# Patient Record
Sex: MALE | Race: WHITE | NOT HISPANIC OR LATINO | URBAN - METROPOLITAN AREA
[De-identification: names, ages, dates, MRNs, and addresses within clinical notes are randomized per-mention and may not be internally consistent; named-entity substitution may affect disease eponyms.]

---

## 2020-10-22 ENCOUNTER — OFFICE VISIT (OUTPATIENT)
Dept: URBAN - METROPOLITAN AREA CLINIC 121 | Facility: CLINIC | Age: 80
End: 2020-10-22

## 2021-07-12 ENCOUNTER — OFFICE VISIT (OUTPATIENT)
Dept: URBAN - METROPOLITAN AREA CLINIC 63 | Facility: CLINIC | Age: 81
End: 2021-07-12

## 2021-08-09 ENCOUNTER — OFFICE VISIT (OUTPATIENT)
Dept: URBAN - METROPOLITAN AREA CLINIC 63 | Facility: CLINIC | Age: 81
End: 2021-08-09

## 2021-09-02 ENCOUNTER — OFFICE VISIT (OUTPATIENT)
Dept: URBAN - METROPOLITAN AREA CLINIC 63 | Facility: CLINIC | Age: 81
End: 2021-09-02

## 2021-11-24 ENCOUNTER — OFFICE VISIT (OUTPATIENT)
Dept: URBAN - METROPOLITAN AREA CLINIC 121 | Facility: CLINIC | Age: 81
End: 2021-11-24

## 2022-01-11 ENCOUNTER — OFFICE VISIT (OUTPATIENT)
Dept: URBAN - METROPOLITAN AREA CLINIC 63 | Facility: CLINIC | Age: 82
End: 2022-01-11

## 2022-07-09 ENCOUNTER — TELEPHONE ENCOUNTER (OUTPATIENT)
Dept: URBAN - METROPOLITAN AREA CLINIC 121 | Facility: CLINIC | Age: 82
End: 2022-07-09

## 2022-07-09 RX ORDER — ATORVASTATIN CALCIUM 40 MG/1
TABLET, FILM COATED ORAL
Refills: 0 | OUTPATIENT
Start: 2021-05-21 | End: 2021-07-12

## 2022-07-09 RX ORDER — TESTOSTERONE CYPIONATE 200 MG/ML
4WK INTERVALS INJECTION, SOLUTION INTRAMUSCULAR
Refills: 0 | OUTPATIENT
Start: 2021-07-12 | End: 2021-08-09

## 2022-07-09 RX ORDER — CARBIDOPA AND LEVODOPA 25; 100 MG/1; MG/1
TABLET ORAL THREE TIMES A DAY
Refills: 0 | OUTPATIENT
Start: 2021-08-09 | End: 2021-08-09

## 2022-07-09 RX ORDER — ATENOLOL 50 MG/1
TABLET ORAL TWICE A DAY
Refills: 0 | OUTPATIENT
Start: 2021-08-09 | End: 2021-09-02

## 2022-07-09 RX ORDER — KETOCONAZOLE 20 MG/G
3X WEEKLY CREAM TOPICAL
Refills: 0 | OUTPATIENT
Start: 2021-08-09 | End: 2021-09-02

## 2022-07-09 RX ORDER — ESOMEPRAZOLE MAGNESIUM 40 MG/1
GRANULE, DELAYED RELEASE ORAL ONCE A DAY
Refills: 0 | OUTPATIENT
Start: 2021-08-09 | End: 2021-09-02

## 2022-07-09 RX ORDER — CARBIDOPA AND LEVODOPA 25; 100 MG/1; MG/1
TABLET ORAL
Refills: 0 | OUTPATIENT
Start: 2021-09-02 | End: 2021-09-02

## 2022-07-09 RX ORDER — CARBIDOPA AND LEVODOPA 25; 100 MG/1; MG/1
TABLET ORAL
Refills: 0 | OUTPATIENT
Start: 2021-05-14 | End: 2021-07-12

## 2022-07-09 RX ORDER — ESOMEPRAZOLE MAGNESIUM 40 MG/1
GRANULE, DELAYED RELEASE ORAL ONCE A DAY
Refills: 0 | OUTPATIENT
Start: 2021-07-12 | End: 2021-08-09

## 2022-07-09 RX ORDER — ATENOLOL 50 MG/1
TABLET ORAL TWICE A DAY
Refills: 0 | OUTPATIENT
Start: 2021-09-02 | End: 2021-09-02

## 2022-07-09 RX ORDER — TESTOSTERONE CYPIONATE 200 MG/ML
4WK INTERVALS INJECTION, SOLUTION INTRAMUSCULAR
Refills: 0 | OUTPATIENT
Start: 2021-08-09 | End: 2021-09-02

## 2022-07-09 RX ORDER — CLONAZEPAM 0.5 MG/1
TABLET ORAL ONCE A DAY
Refills: 0 | OUTPATIENT
Start: 2021-07-12 | End: 2021-08-09

## 2022-07-09 RX ORDER — NITROGLYCERIN 0.4 MG/1
TABLET SUBLINGUAL AS NEEDED
Refills: 0 | OUTPATIENT
Start: 2021-09-02 | End: 2022-01-11

## 2022-07-09 RX ORDER — CLONAZEPAM 0.5 MG/1
TABLET ORAL ONCE A DAY
Refills: 0 | OUTPATIENT
Start: 2021-09-02 | End: 2022-01-11

## 2022-07-09 RX ORDER — ASPIRIN 81 MG/1
TABLET, COATED ORAL ONCE A DAY
Refills: 0 | OUTPATIENT
Start: 2021-07-12 | End: 2021-08-09

## 2022-07-09 RX ORDER — TESTOSTERONE CYPIONATE 200 MG/ML
4WK INTERVALS INJECTION, SOLUTION INTRAMUSCULAR
Refills: 0 | OUTPATIENT
Start: 2021-09-02 | End: 2022-01-11

## 2022-07-09 RX ORDER — CARBIDOPA AND LEVODOPA 25; 100 MG/1; MG/1
TABLET ORAL THREE TIMES A DAY
Refills: 0 | OUTPATIENT
Start: 2021-07-12 | End: 2021-08-09

## 2022-07-09 RX ORDER — NITROGLYCERIN 0.4 MG/1
TABLET SUBLINGUAL AS NEEDED
Refills: 0 | OUTPATIENT
Start: 2021-08-09 | End: 2021-09-02

## 2022-07-09 RX ORDER — ESOMEPRAZOLE MAGNESIUM 40 MG/1
ONCE A DAY CAPSULE, DELAYED RELEASE ORAL ONCE A DAY
Refills: 0 | OUTPATIENT
Start: 2021-09-07 | End: 2021-10-12

## 2022-07-09 RX ORDER — LEVOTHYROXINE SODIUM 50 UG/1
TABLET ORAL ONCE A DAY
Refills: 0 | OUTPATIENT
Start: 2021-08-09 | End: 2021-09-02

## 2022-07-09 RX ORDER — AMLODIPINE BESYLATE 10 MG/1
TABLET ORAL ONCE A DAY
Refills: 0 | OUTPATIENT
Start: 2021-07-12 | End: 2021-08-09

## 2022-07-09 RX ORDER — CARBIDOPA AND LEVODOPA 25; 100 MG/1; MG/1
TABLET ORAL
Refills: 0 | OUTPATIENT
Start: 2021-08-09 | End: 2021-09-02

## 2022-07-09 RX ORDER — SILDENAFIL 20 MG/1
TABLET, FILM COATED ORAL AS NEEDED
Refills: 0 | OUTPATIENT
Start: 2021-08-09 | End: 2021-09-02

## 2022-07-09 RX ORDER — ESOMEPRAZOLE MAGNESIUM 40 MG/1
ONCE A DAY CAPSULE, DELAYED RELEASE ORAL ONCE A DAY
Refills: 0 | OUTPATIENT
Start: 2021-10-12 | End: 2021-10-29

## 2022-07-09 RX ORDER — SILDENAFIL 20 MG/1
TABLET, FILM COATED ORAL AS NEEDED
Refills: 0 | OUTPATIENT
Start: 2021-07-12 | End: 2021-08-09

## 2022-07-09 RX ORDER — LEVOTHYROXINE SODIUM 50 UG/1
TABLET ORAL ONCE A DAY
Refills: 0 | OUTPATIENT
Start: 2021-07-12 | End: 2021-08-09

## 2022-07-09 RX ORDER — ATENOLOL 50 MG/1
TABLET ORAL ONCE A DAY
Refills: 0 | OUTPATIENT
Start: 2021-09-02 | End: 2022-01-11

## 2022-07-09 RX ORDER — ESOMEPRAZOLE MAGNESIUM 40 MG/1
GRANULE, DELAYED RELEASE ORAL ONCE A DAY
Refills: 0 | OUTPATIENT
Start: 2021-09-02 | End: 2022-01-11

## 2022-07-09 RX ORDER — AMLODIPINE BESYLATE 10 MG/1
TABLET ORAL
Refills: 0 | OUTPATIENT
Start: 2021-05-27 | End: 2021-07-12

## 2022-07-09 RX ORDER — ASPIRIN 81 MG/1
TABLET, COATED ORAL ONCE A DAY
Refills: 0 | OUTPATIENT
Start: 2021-09-02 | End: 2022-01-11

## 2022-07-09 RX ORDER — ATENOLOL 50 MG/1
TABLET ORAL TWICE A DAY
Refills: 0 | OUTPATIENT
Start: 2021-08-09 | End: 2021-08-09

## 2022-07-09 RX ORDER — NITROGLYCERIN 0.4 MG/1
TABLET SUBLINGUAL AS NEEDED
Refills: 0 | OUTPATIENT
Start: 2021-07-12 | End: 2021-08-09

## 2022-07-09 RX ORDER — ATORVASTATIN CALCIUM 40 MG/1
TABLET, FILM COATED ORAL ONCE A DAY
Refills: 0 | OUTPATIENT
Start: 2021-08-09 | End: 2021-09-02

## 2022-07-09 RX ORDER — PANTOPRAZOLE SODIUM 40 MG/1
TABLET, DELAYED RELEASE ORAL
Refills: 0 | OUTPATIENT
Start: 2021-07-07 | End: 2021-07-12

## 2022-07-09 RX ORDER — ATORVASTATIN CALCIUM 40 MG/1
TABLET, FILM COATED ORAL ONCE A DAY
Refills: 0 | OUTPATIENT
Start: 2021-07-12 | End: 2021-08-09

## 2022-07-09 RX ORDER — ESOMEPRAZOLE MAGNESIUM 40 MG/1
ONCE A DAY CAPSULE, DELAYED RELEASE ORAL ONCE A DAY
Refills: 0 | OUTPATIENT
Start: 2021-07-28 | End: 2021-09-07

## 2022-07-09 RX ORDER — ESOMEPRAZOLE MAGNESIUM 40 MG/1
ONCE A DAY CAPSULE, DELAYED RELEASE ORAL ONCE A DAY
Refills: 0 | OUTPATIENT
Start: 2021-10-29 | End: 2022-01-11

## 2022-07-09 RX ORDER — KETOCONAZOLE 20 MG/G
3X WEEKLY CREAM TOPICAL
Refills: 0 | OUTPATIENT
Start: 2021-07-12 | End: 2021-08-09

## 2022-07-09 RX ORDER — ATENOLOL 50 MG/1
TABLET ORAL TWICE A DAY
Refills: 0 | OUTPATIENT
Start: 2021-07-12 | End: 2021-08-09

## 2022-07-09 RX ORDER — AMLODIPINE BESYLATE 10 MG/1
TABLET ORAL ONCE A DAY
Refills: 0 | OUTPATIENT
Start: 2021-08-09 | End: 2021-09-02

## 2022-07-09 RX ORDER — LEVOTHYROXINE SODIUM 50 UG/1
TABLET ORAL
Refills: 0 | OUTPATIENT
Start: 2021-06-30 | End: 2021-07-12

## 2022-07-09 RX ORDER — ASPIRIN 81 MG/1
TABLET, COATED ORAL ONCE A DAY
Refills: 0 | OUTPATIENT
Start: 2021-08-09 | End: 2021-09-02

## 2022-07-09 RX ORDER — CLONAZEPAM 0.5 MG/1
TABLET ORAL ONCE A DAY
Refills: 0 | OUTPATIENT
Start: 2021-08-09 | End: 2021-09-02

## 2022-07-09 RX ORDER — KETOCONAZOLE 20 MG/G
3X WEEKLY CREAM TOPICAL
Refills: 0 | OUTPATIENT
Start: 2021-09-02 | End: 2022-01-11

## 2022-07-09 RX ORDER — SILDENAFIL 20 MG/1
TABLET, FILM COATED ORAL AS NEEDED
Refills: 0 | OUTPATIENT
Start: 2021-09-02 | End: 2022-01-11

## 2022-07-10 ENCOUNTER — TELEPHONE ENCOUNTER (OUTPATIENT)
Dept: URBAN - METROPOLITAN AREA CLINIC 121 | Facility: CLINIC | Age: 82
End: 2022-07-10

## 2022-07-10 RX ORDER — LEVOTHYROXINE SODIUM 50 UG/1
TABLET ORAL ONCE A DAY
Refills: 0 | Status: ACTIVE | COMMUNITY
Start: 2021-09-02

## 2022-07-10 RX ORDER — ESOMEPRAZOLE MAGNESIUM 40 MG/1
ONCE A DAY CAPSULE, DELAYED RELEASE ORAL ONCE A DAY
Refills: 3 | Status: ACTIVE | COMMUNITY
Start: 2022-01-11

## 2022-07-10 RX ORDER — AMLODIPINE BESYLATE 10 MG/1
TABLET ORAL ONCE A DAY
Refills: 0 | Status: ACTIVE | COMMUNITY
Start: 2021-09-02

## 2022-07-10 RX ORDER — KETOCONAZOLE 20 MG/G
3X WEEKLY CREAM TOPICAL
Refills: 0 | Status: ACTIVE | COMMUNITY
Start: 2022-01-11

## 2022-07-10 RX ORDER — CLONAZEPAM 0.5 MG/1
TABLET ORAL
Refills: 0 | Status: ACTIVE | COMMUNITY
Start: 2022-01-11

## 2022-07-10 RX ORDER — ASPIRIN 81 MG/1
TABLET, COATED ORAL ONCE A DAY
Refills: 0 | Status: ACTIVE | COMMUNITY
Start: 2022-01-11

## 2022-07-10 RX ORDER — ATORVASTATIN CALCIUM 40 MG/1
TABLET, FILM COATED ORAL ONCE A DAY
Refills: 0 | Status: ACTIVE | COMMUNITY
Start: 2022-01-11

## 2022-07-10 RX ORDER — SILDENAFIL 20 MG/1
TABLET, FILM COATED ORAL AS NEEDED
Refills: 0 | Status: ACTIVE | COMMUNITY
Start: 2022-01-11

## 2022-07-10 RX ORDER — AMLODIPINE BESYLATE 10 MG/1
TABLET ORAL ONCE A DAY
Refills: 0 | Status: ACTIVE | COMMUNITY
Start: 2022-01-11

## 2022-07-10 RX ORDER — LEVOTHYROXINE SODIUM 50 UG/1
TABLET ORAL ONCE A DAY
Refills: 0 | Status: ACTIVE | COMMUNITY
Start: 2022-01-11

## 2022-07-10 RX ORDER — TESTOSTERONE CYPIONATE 200 MG/ML
4WK INTERVALS INJECTION, SOLUTION INTRAMUSCULAR
Refills: 0 | Status: ACTIVE | COMMUNITY
Start: 2022-01-11

## 2022-07-10 RX ORDER — CARBIDOPA AND LEVODOPA 25; 100 MG/1; MG/1
TABLET ORAL
Refills: 0 | Status: ACTIVE | COMMUNITY
Start: 2022-01-11

## 2022-07-10 RX ORDER — NITROGLYCERIN 0.4 MG/1
TABLET SUBLINGUAL AS NEEDED
Refills: 0 | Status: ACTIVE | COMMUNITY
Start: 2022-01-11

## 2022-07-10 RX ORDER — ATENOLOL 50 MG/1
TABLET ORAL ONCE A DAY
Refills: 0 | Status: ACTIVE | COMMUNITY
Start: 2022-01-11

## 2022-07-10 RX ORDER — CARBIDOPA AND LEVODOPA 25; 100 MG/1; MG/1
7 TABS QD TABLET ORAL
Refills: 0 | Status: ACTIVE | COMMUNITY
Start: 2021-09-02

## 2022-07-10 RX ORDER — ATORVASTATIN CALCIUM 40 MG/1
TABLET, FILM COATED ORAL ONCE A DAY
Refills: 0 | Status: ACTIVE | COMMUNITY
Start: 2021-09-02

## 2023-01-04 ENCOUNTER — TELEPHONE ENCOUNTER (OUTPATIENT)
Dept: URBAN - METROPOLITAN AREA CLINIC 63 | Facility: CLINIC | Age: 83
End: 2023-01-04

## 2023-01-04 RX ORDER — ESOMEPRAZOLE MAGNESIUM 40 MG/1
AS DIRECTED CAPSULE, DELAYED RELEASE ORAL ONCE A DAY
Qty: 90 CAPSULE | Refills: 0

## 2023-03-15 ENCOUNTER — WEB ENCOUNTER (OUTPATIENT)
Dept: URBAN - METROPOLITAN AREA CLINIC 63 | Facility: CLINIC | Age: 83
End: 2023-03-15

## 2023-03-15 ENCOUNTER — OFFICE VISIT (OUTPATIENT)
Dept: URBAN - METROPOLITAN AREA CLINIC 63 | Facility: CLINIC | Age: 83
End: 2023-03-15
Payer: MEDICARE

## 2023-03-15 VITALS
SYSTOLIC BLOOD PRESSURE: 124 MMHG | HEART RATE: 53 BPM | OXYGEN SATURATION: 98 % | BODY MASS INDEX: 21.13 KG/M2 | WEIGHT: 156 LBS | DIASTOLIC BLOOD PRESSURE: 64 MMHG | TEMPERATURE: 96.9 F | HEIGHT: 72 IN

## 2023-03-15 DIAGNOSIS — K62.5 RECTAL BLEEDING: ICD-10-CM

## 2023-03-15 DIAGNOSIS — G20 PARKINSON DISEASE: ICD-10-CM

## 2023-03-15 DIAGNOSIS — K59.04 CHRONIC IDIOPATHIC CONSTIPATION: ICD-10-CM

## 2023-03-15 PROBLEM — 82934008 CHRONIC IDIOPATHIC CONSTIPATION: Status: ACTIVE | Noted: 2023-03-15

## 2023-03-15 PROBLEM — 49049000: Status: ACTIVE | Noted: 2023-03-15

## 2023-03-15 PROCEDURE — 99214 OFFICE O/P EST MOD 30 MIN: CPT | Performed by: NURSE PRACTITIONER

## 2023-03-15 RX ORDER — CLONAZEPAM 0.5 MG/1
TABLET ORAL
Refills: 0 | Status: ACTIVE | COMMUNITY
Start: 2022-01-11

## 2023-03-15 RX ORDER — NITROGLYCERIN 0.4 MG/1
TABLET SUBLINGUAL AS NEEDED
Refills: 0 | Status: ACTIVE | COMMUNITY
Start: 2022-01-11

## 2023-03-15 RX ORDER — SILDENAFIL 20 MG/1
TABLET, FILM COATED ORAL AS NEEDED
Refills: 0 | Status: ACTIVE | COMMUNITY
Start: 2022-01-11

## 2023-03-15 RX ORDER — ASPIRIN 81 MG/1
TABLET, COATED ORAL ONCE A DAY
Refills: 0 | Status: ACTIVE | COMMUNITY
Start: 2022-01-11

## 2023-03-15 RX ORDER — AMLODIPINE BESYLATE 10 MG/1
TABLET ORAL ONCE A DAY
Refills: 0 | Status: ACTIVE | COMMUNITY
Start: 2022-01-11

## 2023-03-15 RX ORDER — ESOMEPRAZOLE MAGNESIUM 40 MG/1
AS DIRECTED CAPSULE, DELAYED RELEASE ORAL ONCE A DAY
Qty: 90 CAPSULE | Refills: 0 | Status: ACTIVE | COMMUNITY

## 2023-03-15 RX ORDER — LEVOTHYROXINE SODIUM 50 UG/1
TABLET ORAL ONCE A DAY
Refills: 0 | Status: ACTIVE | COMMUNITY
Start: 2022-01-11

## 2023-03-15 RX ORDER — ATORVASTATIN CALCIUM 40 MG/1
TABLET, FILM COATED ORAL ONCE A DAY
Refills: 0 | Status: ACTIVE | COMMUNITY
Start: 2021-09-02

## 2023-03-15 RX ORDER — CARBIDOPA AND LEVODOPA 25; 100 MG/1; MG/1
7 TABS QD TABLET ORAL
Refills: 0 | Status: ACTIVE | COMMUNITY
Start: 2021-09-02

## 2023-03-15 RX ORDER — ATENOLOL 50 MG/1
TABLET ORAL ONCE A DAY
Refills: 0 | Status: ACTIVE | COMMUNITY
Start: 2022-01-11

## 2023-03-15 RX ORDER — BISACODYL 5 MG
1 TABLET AS NEEDED TABLET, DELAYED RELEASE (ENTERIC COATED) ORAL ONCE A DAY
Status: ACTIVE | COMMUNITY

## 2023-03-15 NOTE — HPI-PREVIOUS PROCEDURES
EGD/04 February 2020 (Saroj Baez MD).  There was salmon-colored mucosa suggestive of short segment Lau's esophagus noted in the GE junction.  Multiple cold forceps biopsies were performed for Lau's surveillance.  The biopsies were taken of the GE junction. PATHOLOGY: Gastroesophageal junction biopsy demonstrates reflux esophagitis changes and negative for intestinal metaplasia and dysplasia. PLAN: Recommend repeat EGD in 2 years.  Continue antireflux measures. ********** Colonoscopy/01 February 2019 (Saroj Ramírez MD).  There is stool found in the sigmoid colon.  6 mm tubular adenomatous polyp removed from the cecum.  5 mm tubular adenomatous polyp removed from the ascending colon.  Diverticulosis noted in the descending and sigmoid colon with internal hemorrhoids present.  All remaining findings are negative or benign.

## 2023-03-15 NOTE — HPI-HPI
Thank you very much for kindly referring Pepe Espinoza, a very pleasant 82-year-old male, back to our service for constipation.  Past medical history significant for atrial fibrillation, PVCs, CAD (PTCA with stent), HTN, HLD, PVD, sleep apnea (CPAP), COPD, Parkinson's disease, GERD, Lau's esophagus and colon polyps.  Past surgical history significant for tooth extraction.  His last EGD was 04 February 2020 and his last complete colonoscopy was 01 February 2019 (see results below).  Pepe presents today complaining of constipation averaging 1 bowel movement per week with use of a single Dulcolax tablet every other day.  He tried using it every day with no significant change but when he uses 2 tablets/day and results and "blowout" stools.  He relates an episode of painless bright red blood per rectum last week with blood streaking the side of the stool and on the paper after a difficult to pass bowel movement.  He has had no further bleeding since that time.  He is otherwise asymptomatic from a GI perspective.

## 2023-03-15 NOTE — HPI-PREVIOUS IMAGING
CT virtual colonography/19 August 2021.  1.  No large polyp, stricture or mass.  Mild to moderate diverticulosis of the sigmoid colon.  No further colorectal cancer screening is recommended due to age. ********** XR UGI with swallow including  delayed imaging/05 August 2021.  Normal single contrast barium UGI.  Mildly dilated loops of bowel in the mid abdomen and right lower abdomen, abdomen/pelvis CT may be considered for further evaluation. ********** Chest x-ray performed July 7, 2021 revealing chronic interstitial opacities, no definite acute abnormality. ********** CT abdomen and pelvis/06 July 2021. No acute abnormality in the abdomen or pelvis.  No evidence of suspicious masses or lymphadenopathy.  Several small simple appearing renal cysts bilaterally.  No evidence for stones or obstructive uropathy.  Large diverticulum of the duodenum without inflammatory changes.  Constipation.

## 2023-03-24 ENCOUNTER — ERX REFILL RESPONSE (OUTPATIENT)
Dept: URBAN - METROPOLITAN AREA CLINIC 63 | Facility: CLINIC | Age: 83
End: 2023-03-24

## 2023-03-24 RX ORDER — ESOMEPRAZOLE MAGNESIUM 40 MG/1
AS DIRECTED CAPSULE, DELAYED RELEASE ORAL ONCE A DAY
Qty: 90 CAPSULE | Refills: 0 | OUTPATIENT

## 2023-03-24 RX ORDER — ESOMEPRAZOLE MAGNESIUM 40 MG/1
TAKE 1 CAPSULE BY MOUTH ONCE DAILY AS DIRECTED (WILL  NEED  AN  OFFICE  VISIT  OR  TELEHEALTH  FOR  FURTHER  REFILLS) CAPSULE, DELAYED RELEASE ORAL
Qty: 90 CAPSULE | Refills: 0 | OUTPATIENT

## 2023-06-26 ENCOUNTER — ERX REFILL RESPONSE (OUTPATIENT)
Dept: URBAN - METROPOLITAN AREA CLINIC 63 | Facility: CLINIC | Age: 83
End: 2023-06-26

## 2023-06-26 RX ORDER — ESOMEPRAZOLE MAGNESIUM 40 MG/1
TAKE 1 CAPSULE BY MOUTH ONCE DAILY AS DIRECTED (WILL  NEED  AN  OFFICE  VISIT  OR  TELEHEALTH  FOR  FURTHER  REFILLS) CAPSULE, DELAYED RELEASE ORAL
Qty: 90 CAPSULE | Refills: 0 | OUTPATIENT

## 2023-09-18 ENCOUNTER — ERX REFILL RESPONSE (OUTPATIENT)
Dept: URBAN - METROPOLITAN AREA CLINIC 63 | Facility: CLINIC | Age: 83
End: 2023-09-18

## 2023-09-18 RX ORDER — ESOMEPRAZOLE MAGNESIUM 40 MG/1
TAKE 1 CAPSULE BY MOUTH ONCE DAILY AS DIRECTED (WILL  NEED  AN  OFFICE  VISIT  OR  TELEHEALTH  FOR  FURTHER  REFILLS) CAPSULE, DELAYED RELEASE ORAL
Qty: 90 CAPSULE | Refills: 0 | OUTPATIENT

## 2023-09-18 RX ORDER — ESOMEPRAZOLE MAGNESIUM 40 MG/1
TAKE 1 CAPSULE BY MOUTH ONCE DAILY AS DIRECTED CAPSULE, DELAYED RELEASE ORAL
Qty: 90 CAPSULE | Refills: 1 | OUTPATIENT

## 2023-10-05 ENCOUNTER — TELEPHONE ENCOUNTER (OUTPATIENT)
Dept: URBAN - METROPOLITAN AREA CLINIC 46 | Facility: CLINIC | Age: 83
End: 2023-10-05

## 2023-10-06 ENCOUNTER — OFFICE VISIT (OUTPATIENT)
Dept: URBAN - METROPOLITAN AREA CLINIC 63 | Facility: CLINIC | Age: 83
End: 2023-10-06
Payer: MEDICARE

## 2023-10-06 ENCOUNTER — TELEPHONE ENCOUNTER (OUTPATIENT)
Dept: URBAN - METROPOLITAN AREA CLINIC 63 | Facility: CLINIC | Age: 83
End: 2023-10-06

## 2023-10-06 VITALS
HEART RATE: 71 BPM | DIASTOLIC BLOOD PRESSURE: 80 MMHG | OXYGEN SATURATION: 96 % | HEIGHT: 72 IN | BODY MASS INDEX: 20.18 KG/M2 | SYSTOLIC BLOOD PRESSURE: 120 MMHG | WEIGHT: 149 LBS | TEMPERATURE: 98.6 F

## 2023-10-06 DIAGNOSIS — K56.41 FECAL IMPACTION OF COLON: ICD-10-CM

## 2023-10-06 PROCEDURE — 99214 OFFICE O/P EST MOD 30 MIN: CPT | Performed by: NURSE PRACTITIONER

## 2023-10-06 RX ORDER — ASPIRIN 81 MG/1
TABLET, COATED ORAL ONCE A DAY
Refills: 0 | Status: ACTIVE | COMMUNITY
Start: 2022-01-11

## 2023-10-06 RX ORDER — CLONAZEPAM 0.5 MG/1
TABLET ORAL
Refills: 0 | Status: ACTIVE | COMMUNITY
Start: 2022-01-11

## 2023-10-06 RX ORDER — CARBIDOPA AND LEVODOPA 25; 100 MG/1; MG/1
7 TABS QD TABLET ORAL
Refills: 0 | Status: ACTIVE | COMMUNITY
Start: 2021-09-02

## 2023-10-06 RX ORDER — ONDANSETRON HYDROCHLORIDE 4 MG/1
1 TABLET TABLET, FILM COATED ORAL
Qty: 7 | Refills: 1 | OUTPATIENT
Start: 2023-10-06

## 2023-10-06 RX ORDER — BISACODYL 5 MG
1 TABLET AS NEEDED TABLET, DELAYED RELEASE (ENTERIC COATED) ORAL ONCE A DAY
Status: ACTIVE | COMMUNITY

## 2023-10-06 RX ORDER — NITROGLYCERIN 0.4 MG/1
TABLET SUBLINGUAL AS NEEDED
Refills: 0 | Status: ACTIVE | COMMUNITY
Start: 2022-01-11

## 2023-10-06 RX ORDER — ESOMEPRAZOLE MAGNESIUM 40 MG/1
TAKE 1 CAPSULE BY MOUTH ONCE DAILY AS DIRECTED CAPSULE, DELAYED RELEASE ORAL
Qty: 90 CAPSULE | Refills: 1 | Status: ACTIVE | COMMUNITY

## 2023-10-06 RX ORDER — POLYETHYLENE GLYCOL 3350, SODIUM SULFATE, SODIUM CHLORIDE, POTASSIUM CHLORIDE, ASCORBIC ACID, SODIUM ASCORBATE 140-9-5.2G
1,000 ML KIT ORAL ONCE
Refills: 0
Start: 2023-10-06 | End: 2023-10-07

## 2023-10-06 RX ORDER — ATORVASTATIN CALCIUM 40 MG/1
TABLET, FILM COATED ORAL ONCE A DAY
Refills: 0 | Status: ACTIVE | COMMUNITY
Start: 2021-09-02

## 2023-10-06 RX ORDER — AMLODIPINE BESYLATE 10 MG/1
TABLET ORAL ONCE A DAY
Refills: 0 | Status: ACTIVE | COMMUNITY
Start: 2022-01-11

## 2023-10-06 RX ORDER — LEVOTHYROXINE SODIUM 50 UG/1
TABLET ORAL ONCE A DAY
Refills: 0 | Status: ACTIVE | COMMUNITY
Start: 2022-01-11

## 2023-10-06 RX ORDER — ATENOLOL 50 MG/1
TABLET ORAL ONCE A DAY
Refills: 0 | Status: ACTIVE | COMMUNITY
Start: 2022-01-11

## 2023-10-06 NOTE — HPI-PREVIOUS IMAGING
CT abdomen and pelvis without contrast/04 October 2023.  1. Moderate amount of stool throughout the colon. Retained stool in the rectum measures 7.9 cm in diameter. Correlate for fecal impaction. No proximal small bowel distention or inflammatory changes.  2. Low-attenuation renal lesions measure up to 6.1 x 4.4 cm in the superior pole left kidney, likely cysts. ********** CT virtual colonography/19 August 2021.  1.  No large polyp, stricture or mass.  Mild to moderate diverticulosis of the sigmoid colon.  No further colorectal cancer screening is recommended due to age. ********** XR UGI with swallow including  delayed imaging/05 August 2021.  Normal single contrast barium UGI.  Mildly dilated loops of bowel in the mid abdomen and right lower abdomen, abdomen/pelvis CT may be considered for further evaluation. ********** Chest x-ray performed July 7, 2021 revealing chronic interstitial opacities, no definite acute abnormality. ********** CT abdomen and pelvis/06 July 2021. No acute abnormality in the abdomen or pelvis.  No evidence of suspicious masses or lymphadenopathy.  Several small simple appearing renal cysts bilaterally.  No evidence for stones or obstructive uropathy.  Large diverticulum of the duodenum without inflammatory changes.  Constipation.

## 2023-10-06 NOTE — HPI-PREVIOUS LABS
Lab work dated 04 October 2023 demonstrates the following abnormalities: Glucose 137.  All remaining lab values of CBC, CMP and lipase are within normal limits.

## 2023-10-06 NOTE — HPI-TODAY'S VISIT:
Pepe Espinoza (Gene) is a very pleasant 83-year-old male seen ER follow-up for constipation.  Past medical history is significant for atrial fibrillation (ASA, atenolol), BERNICE, ASHD, HLD, peripheral vascular disease, gastroesophageal reflux disease/possible Lau's, palpitations, COPD, Parkinson's disease, hypertension, hypogonadism, former smoker and colon polyps.  Past surgical history significant for balloon angioplasty with stent to the right coronary artery and dental surgery.  2 nights ago, Alexandro was feeling abdominal discomfort and states he had not had a bowel movement for 1 week.  He went to the ER at [facility] and was disimpacted and a follow-on soapsuds enema was administered.  He was instructed to use MiraLAX and follow-up with us as an outpatient.  Since that time, he has still not had a bowel movement of any significant quantity and is concerned about this becoming a chronic issue.  He presents today feeling well just wanting to get a regimen that will help keep him regular.  He denies pyrosis, dysphagia, dyspepsia, abdominal pain, blood per rectum, melena or unintentional weight loss.  **********  As per [facility] ED provider note dated 04 October 2023 (Gorge Lofton MD): "The patient is a 83 y.o. male with past medical history of atrial fibrillation, coronary artery disease, hyperlipidemia, COPD, parkinson disease, essential hypertension, Lau's esophagus, constipation and sleep apnea who presents to the emergency department via walk-in with a chief complaint of constipation.  Patient reports that he has been experiencing severe constipation for the past 9 days that has not resolved despite utilizing stool softeners at home every other day.  Symptoms are associated with abdominal bloating and intermittent distention.  Patient currently established with gastroenterologist Rob Calhoun.  At this time he denies any active pain or discomfort.  Review of system otherwise negative.  Reviewed ED Course: Fecal disimpaction with administration of soapsuds enema has been performed. Patient has experienced a bowel movement following soapsuds enema administration.  Patient will be given a dose of MiraLAX and discharged with instructions to follow-up with his primary care provider and gastroenterologist in the outpatient setting.  Return precaution reviewed.  Patient and spouse are agreeable with set plan. Impression/Diagnosis(es): The primary encounter diagnosis was Fecal impaction. Diagnoses of Constipation, unspecified constipation type, Abdominal bloating, and Parkinson's disease, unspecified whether dyskinesia present, unspecified whether manifestations fluctuate were also pertinent to this visit."

## 2023-11-17 ENCOUNTER — DASHBOARD ENCOUNTERS (OUTPATIENT)
Age: 83
End: 2023-11-17

## 2023-11-17 ENCOUNTER — OFFICE VISIT (OUTPATIENT)
Dept: URBAN - METROPOLITAN AREA CLINIC 63 | Facility: CLINIC | Age: 83
End: 2023-11-17
Payer: MEDICARE

## 2023-11-17 VITALS
DIASTOLIC BLOOD PRESSURE: 78 MMHG | SYSTOLIC BLOOD PRESSURE: 120 MMHG | WEIGHT: 144.6 LBS | TEMPERATURE: 98.2 F | OXYGEN SATURATION: 98 % | BODY MASS INDEX: 19.59 KG/M2 | HEIGHT: 72 IN | HEART RATE: 64 BPM

## 2023-11-17 DIAGNOSIS — K59.04 CHRONIC IDIOPATHIC CONSTIPATION: ICD-10-CM

## 2023-11-17 PROCEDURE — 99213 OFFICE O/P EST LOW 20 MIN: CPT | Performed by: NURSE PRACTITIONER

## 2023-11-17 RX ORDER — CARBIDOPA AND LEVODOPA 25; 100 MG/1; MG/1
7 TABS QD TABLET ORAL
Refills: 0 | Status: ACTIVE | COMMUNITY
Start: 2021-09-02

## 2023-11-17 RX ORDER — ASPIRIN 81 MG/1
TABLET, COATED ORAL ONCE A DAY
Refills: 0 | Status: ACTIVE | COMMUNITY
Start: 2022-01-11

## 2023-11-17 RX ORDER — CLONAZEPAM 1 MG/1
1MG TABLET ORAL AT BEDTIME
Refills: 0 | Status: ACTIVE | COMMUNITY
Start: 2022-01-11

## 2023-11-17 RX ORDER — ESOMEPRAZOLE MAGNESIUM 40 MG/1
TAKE 1 CAPSULE BY MOUTH ONCE DAILY AS DIRECTED CAPSULE, DELAYED RELEASE ORAL
Qty: 90 CAPSULE | Refills: 1 | Status: ACTIVE | COMMUNITY

## 2023-11-17 RX ORDER — BISACODYL 5 MG
1 TABLET AS NEEDED TABLET, DELAYED RELEASE (ENTERIC COATED) ORAL ONCE A DAY
Status: ACTIVE | COMMUNITY

## 2023-11-17 RX ORDER — LEVOTHYROXINE SODIUM 50 UG/1
TABLET ORAL ONCE A DAY
Refills: 0 | Status: ACTIVE | COMMUNITY
Start: 2022-01-11

## 2023-11-17 RX ORDER — ATENOLOL 50 MG/1
TABLET ORAL ONCE A DAY
Refills: 0 | Status: ACTIVE | COMMUNITY
Start: 2022-01-11

## 2023-11-17 RX ORDER — NITROGLYCERIN 0.4 MG/1
TABLET SUBLINGUAL AS NEEDED
Refills: 0 | Status: ACTIVE | COMMUNITY
Start: 2022-01-11

## 2023-11-17 RX ORDER — ATORVASTATIN CALCIUM 40 MG/1
TABLET, FILM COATED ORAL ONCE A DAY
Refills: 0 | Status: ACTIVE | COMMUNITY
Start: 2021-09-02

## 2023-11-17 RX ORDER — AMLODIPINE BESYLATE 10 MG/1
TABLET ORAL ONCE A DAY
Refills: 0 | Status: ACTIVE | COMMUNITY
Start: 2022-01-11

## 2023-11-17 NOTE — HPI-TODAY'S VISIT:
Pepe Espinoza (Gene) is a very pleasant 83-year-old male seen in follow-up of constipation.  At last office visit, it was recommended he use MiraLAX, apples, prunes and Benefiber.  He states no significant change with that regimen but does use MiraLAX daily and magnesium citrate should he need salvage therapy.  This will result in 1 bowel movement every 2-3 days and he is pleased with the results.  He is otherwise asymptomatic from a GI perspective.

## 2024-06-03 ENCOUNTER — TELEPHONE ENCOUNTER (OUTPATIENT)
Dept: URBAN - METROPOLITAN AREA CLINIC 63 | Facility: CLINIC | Age: 84
End: 2024-06-03

## 2024-07-22 ENCOUNTER — OFFICE VISIT (OUTPATIENT)
Dept: URBAN - METROPOLITAN AREA CLINIC 63 | Facility: CLINIC | Age: 84
End: 2024-07-22
Payer: MEDICARE

## 2024-07-22 VITALS
WEIGHT: 143 LBS | SYSTOLIC BLOOD PRESSURE: 118 MMHG | BODY MASS INDEX: 19.37 KG/M2 | HEART RATE: 75 BPM | HEIGHT: 72 IN | TEMPERATURE: 98.5 F | DIASTOLIC BLOOD PRESSURE: 74 MMHG | OXYGEN SATURATION: 96 %

## 2024-07-22 DIAGNOSIS — K21.9 GASTROESOPHAGEAL REFLUX DISEASE WITHOUT ESOPHAGITIS: ICD-10-CM

## 2024-07-22 DIAGNOSIS — K22.70 BARRETT'S ESOPHAGUS WITHOUT DYSPLASIA: ICD-10-CM

## 2024-07-22 DIAGNOSIS — K59.04 CHRONIC IDIOPATHIC CONSTIPATION: ICD-10-CM

## 2024-07-22 PROBLEM — 266435005: Status: ACTIVE | Noted: 2024-07-22

## 2024-07-22 PROCEDURE — 99214 OFFICE O/P EST MOD 30 MIN: CPT

## 2024-07-22 RX ORDER — MIRTAZAPINE 7.5 MG/1
2 TABLETS AT BEDTIME TABLET, FILM COATED ORAL ONCE A DAY
Status: ACTIVE | COMMUNITY

## 2024-07-22 RX ORDER — ATORVASTATIN CALCIUM 40 MG/1
TABLET, FILM COATED ORAL ONCE A DAY
Refills: 0 | Status: ACTIVE | COMMUNITY
Start: 2021-09-02

## 2024-07-22 RX ORDER — ESOMEPRAZOLE MAGNESIUM 40 MG/1
1 CAPSULE CAPSULE, DELAYED RELEASE ORAL ONCE A DAY
Qty: 90 CAPSULE | Refills: 1 | Status: ACTIVE | COMMUNITY

## 2024-07-22 RX ORDER — ATENOLOL 50 MG/1
TABLET ORAL ONCE A DAY
Refills: 0 | Status: ACTIVE | COMMUNITY
Start: 2022-01-11

## 2024-07-22 RX ORDER — CLONAZEPAM 1 MG/1
1MG TABLET ORAL AT BEDTIME
Refills: 0 | Status: ACTIVE | COMMUNITY
Start: 2022-01-11

## 2024-07-22 RX ORDER — ESOMEPRAZOLE MAGNESIUM 40 MG/1
1 CAPSULE CAPSULE, DELAYED RELEASE PELLETS ORAL ONCE A DAY
Qty: 90 CAPSULE | Refills: 3 | OUTPATIENT
Start: 2024-07-22

## 2024-07-22 RX ORDER — ASCORBIC ACID 1000 MG
1 TABLET TABLET ORAL ONCE A DAY
Status: ACTIVE | COMMUNITY

## 2024-07-22 RX ORDER — AMLODIPINE BESYLATE 10 MG/1
TABLET ORAL ONCE A DAY
Refills: 0 | Status: ACTIVE | COMMUNITY
Start: 2022-01-11

## 2024-07-22 RX ORDER — TAMSULOSIN HYDROCHLORIDE 0.4 MG/1
1 CAPSULE CAPSULE ORAL ONCE A DAY
Status: ACTIVE | COMMUNITY

## 2024-07-22 RX ORDER — NITROGLYCERIN 0.4 MG/1
TABLET SUBLINGUAL AS NEEDED
Refills: 0 | Status: ACTIVE | COMMUNITY
Start: 2022-01-11

## 2024-07-22 RX ORDER — LEVOTHYROXINE SODIUM 50 UG/1
TABLET ORAL ONCE A DAY
Refills: 0 | Status: ACTIVE | COMMUNITY
Start: 2022-01-11

## 2024-07-22 RX ORDER — ASPIRIN 81 MG/1
TABLET, COATED ORAL ONCE A DAY
Refills: 0 | Status: ACTIVE | COMMUNITY
Start: 2022-01-11

## 2024-07-22 RX ORDER — CARBIDOPA AND LEVODOPA 25; 100 MG/1; MG/1
7 TABS QD TABLET ORAL
Refills: 0 | Status: ACTIVE | COMMUNITY
Start: 2021-09-02

## 2024-07-22 RX ORDER — LORATADINE 10 MG
1 PACKET MIXED WITH 8 OUNCES OF FLUID TABLET,DISINTEGRATING ORAL ONCE A DAY
Status: ACTIVE | COMMUNITY

## 2024-07-22 NOTE — HPI-PREVIOUS LABS
CBC CMP, lipid panel, 3/3/2023 CBC - Within normal limits . CMP - CO2 32 (elevated) - A/G ratio 2.1 (elevated) - GFR greater than 60 . Lipid panel - Within normal limits

## 2024-07-22 NOTE — HPI-PREVIOUS IMAGING
CT colonography, 8/19/2021 - No large polyp, stricture, or mass mild to moderate diverticulosis of the sigmoid colon . Upper GI series with swallow, 8/5/2021 - Normal single contrast barium upper GI.  Mildly dilated loops of bowel in the mid abdomen and right lower abdomen, abdomen/pelvis CT may be considered for further evaluation. . CT abdomen and pelvis with contrast, 7/6/2021 - No acute abnormality in the abdomen or pelvis. - No evidence of suspicious masses or lymphadenopathy. - Several small simple appearing renal cyst bilaterally.  No evidence for stones or obstruction of uropathy. - Large diverticulum of the duodenum without inflammatory changes. - Constipation.

## 2024-07-22 NOTE — HPI-PREVIOUS PROCEDURES
Colonoscopy, 7/26/2021, Dr. Baez - Diverticulosis was noted in the sigmoid colon. - Diverticulosis process was noted in the descending colon. - A 6 mm flat polyp was found in the cecum.  A piecemeal polypectomy was performed using cold snare and cold forceps.  Polyp was completely removed and retrieved. - A 5 mm sessile polyp was found in the ascending colon.  Single piece polypectomy was performed using cold snare.  Removed and retrieved. - Per Dr. Baez colonoscopy in 3 to 5 years depending on pathology report. . EGD, 7/26/2021, Dr. Baez - There was salmon-colored mucosa suggestive sorest short segment Lau's esophagus noted in the GE junction.  Multiple cold forceps biopsies were performed for Lau's surveillance.

## 2024-07-22 NOTE — HPI-TODAY'S VISIT:
This is a very pleasant 83-year-old male who presents to the office with a chief complaint of EGD evaluation, history of Lau's.  Past medical history is significant for atrial fibrillation, Parkinson's, CAD (PTCA with stent), hypertension, hyperlipidemia, PVD, COPD, sleep apnea (CPAP), hypogonadism, GERD, Lau's esophagus, colon polyps (tubular adenoma), diverticulosis.  Past surgical history EGD, RCA.  Family history is noncontributory. Last colonoscopy 07/26/2021, Dr. Ramírez, no further screeening is recommeneded due to age. Last endoscopy 07/26/2021, Dr. Ramírez, Lau's esophagus surveillance Cardiologist:Dr. Rob Friedman, [facility] [place] . Patient was last seen in the office on 11/17/2023 for a 6-week follow-up with JR Gerardo.  He was seen for constipation and was recommended MiraLAX, apples, prunes, and Benefiber he explained no significant change with this regimen, but does use MiraLAX daily and magnesium citrate as salvage therapy.  This will result in 1 bowel movement every 2 to 3 days, which he is pleased with. . Patient presents to the office today doing well, he explains that his esomeprazole 40 mg once daily has good efficacy to his GERD symptoms, and he explains he has no breakthrough symptoms.  Esomeprazole refilled today. . I explained to the patient that due to his advanced age, he will not be  to get a EGD from our physicians at our outpatient surgery center. I explained that due to our withdrawal from the hospital, he will have to be referred to [facility] gastroenterology, where they can determine the risk versus the benefit of repeat surveillance EGDs due to his advanced age and comorbidities.  Patient is agreeable to this plan.  Referral sent for [facility] gastroenterology. . Patient also complains of having constipation, an issue he has dealt with for years.  He explains that he takes magnesium citrate once per week which results in 1 bowel movement per week within 24 hours. He explains that miraLAX does not work for him. I explained that he could take a second dose of magnesium citrate per week to make him more regular.  I also counseled the patient on including Benefiber and his daily regimen.  Patient is amenable to this plan. . Patient denies abdominal pain, belching, bloating, diarrhea, dysphagia, melena, hematochezia, hematemesis, nausea, vomiting, BRBPR, and unintentional weight loss.

## 2024-07-22 NOTE — PHYSICAL EXAM CONSTITUTIONAL:
well-developed, well nourished, in no acute distress, ambulating without difficulty, normal communication ability, flat affect, Parkinson's dyskinesia of the jaw (Parkinson's disease)

## 2024-08-27 ENCOUNTER — OFFICE VISIT (OUTPATIENT)
Dept: URBAN - METROPOLITAN AREA CLINIC 63 | Facility: CLINIC | Age: 84
End: 2024-08-27